# Patient Record
Sex: MALE | Race: WHITE | NOT HISPANIC OR LATINO
[De-identification: names, ages, dates, MRNs, and addresses within clinical notes are randomized per-mention and may not be internally consistent; named-entity substitution may affect disease eponyms.]

---

## 2019-06-10 ENCOUNTER — RECORD ABSTRACTING (OUTPATIENT)
Age: 74
End: 2019-06-10

## 2019-06-10 DIAGNOSIS — Q98.0 KLINEFELTER SYNDROME KARYOTYPE 47, XXY: ICD-10-CM

## 2019-06-10 DIAGNOSIS — Z86.2 PERSONAL HISTORY OF DISEASES OF THE BLOOD AND BLOOD-FORMING ORGANS AND CERTAIN DISORDERS INVOLVING THE IMMUNE MECHANISM: ICD-10-CM

## 2019-06-10 DIAGNOSIS — Z87.438 PERSONAL HISTORY OF OTHER DISEASES OF MALE GENITAL ORGANS: ICD-10-CM

## 2019-06-10 DIAGNOSIS — M19.90 UNSPECIFIED OSTEOARTHRITIS, UNSPECIFIED SITE: ICD-10-CM

## 2019-06-10 DIAGNOSIS — Z86.39 PERSONAL HISTORY OF OTHER ENDOCRINE, NUTRITIONAL AND METABOLIC DISEASE: ICD-10-CM

## 2019-06-10 RX ORDER — SIMVASTATIN 20 MG/1
20 TABLET, FILM COATED ORAL DAILY
Refills: 0 | Status: ACTIVE | COMMUNITY

## 2019-07-22 ENCOUNTER — APPOINTMENT (OUTPATIENT)
Dept: NEPHROLOGY | Facility: CLINIC | Age: 74
End: 2019-07-22
Payer: MEDICARE

## 2019-07-22 VITALS
RESPIRATION RATE: 16 BRPM | BODY MASS INDEX: 27.06 KG/M2 | OXYGEN SATURATION: 97 % | DIASTOLIC BLOOD PRESSURE: 80 MMHG | SYSTOLIC BLOOD PRESSURE: 140 MMHG | WEIGHT: 189 LBS | HEART RATE: 72 BPM | HEIGHT: 70 IN

## 2019-07-22 DIAGNOSIS — Z87.891 PERSONAL HISTORY OF NICOTINE DEPENDENCE: ICD-10-CM

## 2019-07-22 DIAGNOSIS — D46.9 MYELODYSPLASTIC SYNDROME, UNSPECIFIED: ICD-10-CM

## 2019-07-22 DIAGNOSIS — N23 UNSPECIFIED RENAL COLIC: ICD-10-CM

## 2019-07-22 DIAGNOSIS — E78.5 HYPERLIPIDEMIA, UNSPECIFIED: ICD-10-CM

## 2019-07-22 DIAGNOSIS — Z86.39 PERSONAL HISTORY OF OTHER ENDOCRINE, NUTRITIONAL AND METABOLIC DISEASE: ICD-10-CM

## 2019-07-22 PROCEDURE — 99214 OFFICE O/P EST MOD 30 MIN: CPT

## 2019-07-22 NOTE — ASSESSMENT
[FreeTextEntry1] : (1) Stage II- CKD. The recent urine microalbumin to creatinine ratio if 77 mg/g (normal is < 30). The most recent BUN/creatinine levels are 28/1.3 (03/11/2019) with an estimated GFR of 59 cc per minute.  The mildly elevated serum creatinine is likely related in large part to the thiazide diuretic and ACE inhibitor although the presence of proteinuria yields the diagnosis of chronic kidney disease.\par \par (2) Hypertension.  Elevated blood pressure.  Mr. Watts ran out of his amlodipine around 1 week ago.\par \par \par (3) Proteinuria. Contributors are likely the hypertension, diabetes mellitus, and obesity.\par \par (4) Hypertension. Elevated blood pressures. \par \par (5) Diabetes mellitus. Mr. Watts reports that his HbA1c increased from 7.5%  to 11% and then improved to 9%. Has an appointment with Dr. Serenity Black on September 5, 2019.a\par \par (6) Left kidney pain.  Unclear if this is the kidney or musculoskeletal.  Need to rule out nephrolithiasis. \par \par RECOMMENDATIONS:\par \par (1) Continue the ACE inhibitor and thiazide diuretic for both blood pressure control and renal protection\par (2) Amlodipine 5 mg PO daily.\par (3) See Dr. Black re: better diabetic control.\par (4) Renal ultrasound.\par (5) Dr. Traore, please forward Mr. Watts's next set of lab studies to me.  Please recheck a urinalysis and a random urine for microalbumin and creatinine.  You should consider increasing the dose of the lisinopril for control of the microalbuminuria if the urine microalbumin remains elevated\par \par

## 2019-07-22 NOTE — REVIEW OF SYSTEMS
[Shortness Of Breath] : shortness of breath [Diarrhea] : diarrhea [Nocturia] : nocturia [Arthralgias] : arthralgias [As Noted in HPI] : as noted in HPI [Negative] : Heme/Lymph [Dysuria] : no dysuria [FreeTextEntry7] : diarrhea.  Believes it is due to the use of Metformin.  [FreeTextEntry8] : urinary frequency, nocturia every 2 hours [de-identified] : balance issues "my leg gives out", was told that he has "weak tendons".  Underwent balance testing in the past.  Has intemrittent dizziness (occurs while walking though not with standing from a seated position).

## 2019-07-22 NOTE — HISTORY OF PRESENT ILLNESS
[FreeTextEntry1] : Mr. Allan is a 73-year old,  male who was initially referred to me for his chronic kidney\par disease. His past medical history is significant for: (1) Hypertension treated with both an ACE inhibitor\par and thiazide diuretic (2) Diabetes Mellitus since age 62, recently poorly controlled, without diabetic retinopathy (though has had LASER treatments) and with (+) peripheral neuropathy (3) Dyslipidemia (4) MDS diagnosed on a bone marrow biopsy (5) Macrocytic anemia (6) Testicular dysfunction, hypogonadism treated with monthly testosterone (7) Osteoarthritis (8) Klinefelter syndrome (47, XXY) (9) GERD and (10) Chronic kidney disease. Mr. Watts has been taking the hydrochlorothiazide and lisinopril for years. He reports that his diabetes has been poorly controlled.  His blood pressures at home have been good "up until today".  His AM blood pressure today was 160/103, prior to taking medications.  In the past the blood pressures have been good. \par

## 2019-07-22 NOTE — PHYSICAL EXAM
[General Appearance - Alert] : alert [Sclera] : the sclera and conjunctiva were normal [Extraocular Movements] : extraocular movements were intact [Neck Appearance] : the appearance of the neck was normal [Exaggerated Use Of Accessory Muscles For Inspiration] : no accessory muscle use [Respiration, Rhythm And Depth] : normal respiratory rhythm and effort [Auscultation Breath Sounds / Voice Sounds] : lungs were clear to auscultation bilaterally [Heart Rate And Rhythm] : heart rate was normal and rhythm regular [Heart Sounds Gallop] : no gallops [Heart Sounds] : normal S1 and S2 [Murmurs] : no murmurs [Edema] : there was no peripheral edema [Heart Sounds Pericardial Friction Rub] : no pericardial rub [Bowel Sounds] : normal bowel sounds [Abdomen Soft] : soft [Abdomen Tenderness] : non-tender [No CVA Tenderness] : no ~M costovertebral angle tenderness [] : no hepato-splenomegaly [No Spinal Tenderness] : no spinal tenderness [Involuntary Movements] : no involuntary movements were seen [Skin Color & Pigmentation] : normal skin color and pigmentation [Skin Turgor] : normal skin turgor [No Focal Deficits] : no focal deficits [Oriented To Time, Place, And Person] : oriented to person, place, and time [Affect] : the affect was normal [Impaired Insight] : insight and judgment were intact [Mood] : the mood was normal

## 2019-07-22 NOTE — CONSULT LETTER
[Dear  ___] : Dear  [unfilled], [Consult Letter:] : I had the pleasure of evaluating your patient, [unfilled]. [Consult Closing:] : Thank you very much for allowing me to participate in the care of this patient.  If you have any questions, please do not hesitate to contact me. [Please see my note below.] : Please see my note below. [FreeTextEntry3] : Rebel [Sincerely,] : Sincerely, [DrCaren  ___] : Dr. DOSS

## 2020-01-21 ENCOUNTER — APPOINTMENT (OUTPATIENT)
Dept: NEPHROLOGY | Facility: CLINIC | Age: 75
End: 2020-01-21
Payer: MEDICARE

## 2020-01-21 VITALS
HEART RATE: 76 BPM | BODY MASS INDEX: 27.63 KG/M2 | DIASTOLIC BLOOD PRESSURE: 72 MMHG | WEIGHT: 193 LBS | SYSTOLIC BLOOD PRESSURE: 148 MMHG | HEIGHT: 70 IN

## 2020-01-21 PROCEDURE — 99214 OFFICE O/P EST MOD 30 MIN: CPT

## 2020-01-21 RX ORDER — TESTOSTERONE CYPIONATE 200 MG/ML
200 INJECTION, SOLUTION INTRAMUSCULAR
Refills: 0 | Status: COMPLETED | COMMUNITY
Start: 1900-01-01 | End: 2020-01-21

## 2020-01-21 NOTE — REVIEW OF SYSTEMS
[Recent Weight Gain (___ Lbs)] : recent [unfilled] ~Ulb weight gain [Nocturia] : nocturia [As Noted in HPI] : as noted in HPI [Negative] : Heme/Lymph [FreeTextEntry2] : hand pain [FreeTextEntry8] : urinary frequency during the days (every 2 hours during the days and the nights) [de-identified] : Numbness and tingling in the feet and finger tips

## 2020-01-21 NOTE — HISTORY OF PRESENT ILLNESS
[FreeTextEntry1] : Moshe Allan is a 74-year old,  male who was initially referred to me for his chronic kidney\par disease. His past medical history is significant for: (1) Hypertension treated with both an ACE inhibitor\par and thiazide diuretic (2) Diabetes Mellitus since age 62, without diabetic retinopathy (though has had\par LASER treatments) and with (+) peripheral neuropathy (3) Dyslipidemia (4) MDS diagnosed on a\par bone marrow biopsy (5) Macrocytic anemia (6) Testicular dysfunction/hypogonadism treated with\par monthly testosterone (7) Osteoarthritis (8) Klinefelter syndrome (47, XXY) (9) GERD and (10) Chronic\par kidney disease. Mr. Corona has been taking the hydrochlorothiazide and lisinopril for years. The\par doses had not been changed. \par \par Mr. Corona is here for follow. He came accompanied by his wife, .  He did not get a renal ultrasound. He is feeling well.  He continues to have urinary frequency "every two hours" during the days and the nights. His full ROS is below.\par

## 2020-01-21 NOTE — CONSULT LETTER
[Dear  ___] : Dear  [unfilled], [Consult Letter:] : I had the pleasure of evaluating your patient, [unfilled]. [Please see my note below.] : Please see my note below. [Consult Closing:] : Thank you very much for allowing me to participate in the care of this patient.  If you have any questions, please do not hesitate to contact me. [FreeTextEntry3] : Rebel [Sincerely,] : Sincerely, [DrCaren  ___] : Dr. DOSS

## 2020-01-21 NOTE — ASSESSMENT
[FreeTextEntry1] : (1) Stage II CKD.  The recent BUN/creatinine levels are 28/1.3 (03/11/2019), 32/1.2 (09/09/2019), 27/1.6 (12/30/2019).  The recent urine microalbumin to creatinine ratio if 77 mg/g (normal is < 30). In the prior note I commented that the labs of 03/11/2019 had an estimated GFR of 59 cc per minute.  I thought that the  mildly elevated serum creatininewas likely related in large part to the thiazide diuretic and ACE inhibitor although the presence of proteinuria did support a diagnosis of chronic kidney disease.\par \par The recent serum creatinine has increased to 1.6 mg/dL.  I am unsure if this increase is due to taking NSAIDS (which Mr. Corona uses), is related to weight gain and diet, is related to issues emptying the bladder or another etiology.  \par \par (2) Hypertension.  Mildly elevated blood pressure today. Well controlled outside of this office.   \par \par (3) Proteinuria. Contributors are likely the hypertension, diabetes mellitus, and obesity.\par \par (4) Diabetes mellitus. Mr. Corona reports that his HbA1c increased from 7.5%  to 11% and then improved to 9.3%.  This has improved to 7.8%.  \par \par (5) Left kidney pain. This has resolved.  I wondered whether it was musculoskeletal.\par \par (6) Low 25 hydroxy vitamin D.  Level is 15.9 ng/cc. \par \par RECOMMENDATIONS:\par \par (1) Continue the ACE inhibitor and thiazide diuretic for both blood pressure control and renal protection\par (2) Start vitamin D 3000 IU once daily\par (3) Regular follow ups with Dr. Serenity Black.  \par (4) Renal ultrasound.  Bladder ultrasound\par \par Mr. Corona should have repeat renal function tests done sometime in the next month: BMP.\par He will return to me in 6 months with a CMP, CBC, phosphorous, 25 hydroxy vitamin D, urinalysis, random urine for microalbumin and creatinine.\par \par

## 2020-01-21 NOTE — PHYSICAL EXAM
[General Appearance - In No Acute Distress] : in no acute distress [General Appearance - Alert] : alert [Sclera] : the sclera and conjunctiva were normal [Extraocular Movements] : extraocular movements were intact [Neck Appearance] : the appearance of the neck was normal [] : no respiratory distress [Respiration, Rhythm And Depth] : normal respiratory rhythm and effort [Exaggerated Use Of Accessory Muscles For Inspiration] : no accessory muscle use [Auscultation Breath Sounds / Voice Sounds] : lungs were clear to auscultation bilaterally [Heart Rate And Rhythm] : heart rate was normal and rhythm regular [Heart Sounds] : normal S1 and S2 [Heart Sounds Gallop] : no gallops [Murmurs] : no murmurs [Heart Sounds Pericardial Friction Rub] : no pericardial rub [Edema] : there was no peripheral edema [Bowel Sounds] : normal bowel sounds [Abdomen Soft] : soft [Abdomen Tenderness] : non-tender [No CVA Tenderness] : no ~M costovertebral angle tenderness [No Spinal Tenderness] : no spinal tenderness [Abnormal Walk] : normal gait [Involuntary Movements] : no involuntary movements were seen [Skin Color & Pigmentation] : normal skin color and pigmentation [Skin Turgor] : normal skin turgor [Oriented To Time, Place, And Person] : oriented to person, place, and time [Impaired Insight] : insight and judgment were intact [Affect] : the affect was normal [Mood] : the mood was normal

## 2020-02-15 ENCOUNTER — TRANSCRIPTION ENCOUNTER (OUTPATIENT)
Age: 75
End: 2020-02-15

## 2020-07-16 RX ORDER — HYDROCHLOROTHIAZIDE 25 MG/1
25 TABLET ORAL DAILY
Refills: 0 | Status: DISCONTINUED | COMMUNITY
End: 2020-07-16

## 2020-07-16 RX ORDER — DICLOFENAC SODIUM 1% 10 MG/G
GEL TOPICAL
Refills: 0 | Status: DISCONTINUED | COMMUNITY
End: 2020-07-16

## 2020-07-16 RX ORDER — GLIMEPIRIDE 4 MG/1
4 TABLET ORAL
Refills: 0 | Status: DISCONTINUED | COMMUNITY
End: 2020-07-16

## 2020-07-16 RX ORDER — LISINOPRIL 40 MG/1
40 TABLET ORAL DAILY
Refills: 0 | Status: DISCONTINUED | COMMUNITY
End: 2020-07-16

## 2020-07-20 ENCOUNTER — APPOINTMENT (OUTPATIENT)
Dept: NEPHROLOGY | Facility: CLINIC | Age: 75
End: 2020-07-20
Payer: MEDICARE

## 2020-07-20 VITALS
WEIGHT: 189 LBS | SYSTOLIC BLOOD PRESSURE: 128 MMHG | TEMPERATURE: 97.1 F | BODY MASS INDEX: 27.06 KG/M2 | DIASTOLIC BLOOD PRESSURE: 72 MMHG | OXYGEN SATURATION: 98 % | RESPIRATION RATE: 16 BRPM | HEIGHT: 70 IN | HEART RATE: 77 BPM

## 2020-07-20 DIAGNOSIS — K21.9 GASTRO-ESOPHAGEAL REFLUX DISEASE W/OUT ESOPHAGITIS: ICD-10-CM

## 2020-07-20 PROCEDURE — 99214 OFFICE O/P EST MOD 30 MIN: CPT

## 2020-07-20 NOTE — ASSESSMENT
[FreeTextEntry1] : (1) Stage II CKD with a recent episode of VILLA secondary to dehydration.   The recent BUN/creatinine levels are 28/1.3 (03/11/2019), 32/1.2 (09/09/2019), 27/1.6 (12/30/2019), 25/1.41 (05/28/2020)- the last set of labs followed intravenous hydration in the hospital. The last urine microalbumin to creatinine ratio was 77 mg/g (normal is < 30).  I don't have a recent one.  I\par \par (2) Hypertension.  Well controlled. \par \par (3) Proteinuria. Contributors are likely the hypertension, diabetes mellitus, and obesity. I do not have a recent random urine for microalbumin and creatinine. \par \par (4) Diabetes mellitus. This is reportedly well controlled at home.  I do not have a recent HbA1c. \par \par (5) Low 25 hydroxy vitamin D. \par \par RECOMMENDATIONS:\par \par (1) Continue the current medication regimen.  Stay off of all diuretics.  Stay off of lisinopril for now.  This might need to be added back at a later date for control of the proteinuria. \par (2) Continue the allopurinol.  The goal serum uric acid is under 6 mg/dL.\par (3) Keep salt out of the diet.\par (4) Stay well hydrated.\par (5) Mr. Corona should have a CMP, 25 hydroxy vitamin D, urinalysis, random urine microalbumin and creatinine, and a serum uric acid during his next visit to primary care. \par (6) If all is well, I will see Mr. Corona back in 6 months with a CMP, CBC, uric acid, urinalysis, random urine for microalbumin and creatinine. \par \par \par

## 2020-07-20 NOTE — REVIEW OF SYSTEMS
[Recent Weight Gain (___ Lbs)] : recent [unfilled] ~Ulb weight gain [Nocturia] : nocturia [As Noted in HPI] : as noted in HPI [Negative] : Musculoskeletal [FreeTextEntry2] : hand pain [FreeTextEntry8] : urinary frequency during the days (every 2-3 hours during the days and the nights) [de-identified] : Numbness and tingling in the toes and finger tips "every once in a while" [de-identified] : anemia

## 2020-07-20 NOTE — PHYSICAL EXAM
[General Appearance - Alert] : alert [Sclera] : the sclera and conjunctiva were normal [Extraocular Movements] : extraocular movements were intact [General Appearance - In No Acute Distress] : in no acute distress [] : no respiratory distress [Neck Appearance] : the appearance of the neck was normal [Respiration, Rhythm And Depth] : normal respiratory rhythm and effort [Auscultation Breath Sounds / Voice Sounds] : lungs were clear to auscultation bilaterally [Heart Rate And Rhythm] : heart rate was normal and rhythm regular [Exaggerated Use Of Accessory Muscles For Inspiration] : no accessory muscle use [Murmurs] : no murmurs [Heart Sounds Gallop] : no gallops [Heart Sounds] : normal S1 and S2 [Heart Sounds Pericardial Friction Rub] : no pericardial rub [Bowel Sounds] : normal bowel sounds [Edema] : there was no peripheral edema [Abdomen Soft] : soft [Abdomen Tenderness] : non-tender [No CVA Tenderness] : no ~M costovertebral angle tenderness [Abnormal Walk] : normal gait [No Spinal Tenderness] : no spinal tenderness [Involuntary Movements] : no involuntary movements were seen [Skin Turgor] : normal skin turgor [Skin Color & Pigmentation] : normal skin color and pigmentation [Affect] : the affect was normal [Impaired Insight] : insight and judgment were intact [Oriented To Time, Place, And Person] : oriented to person, place, and time [Mood] : the mood was normal [No Focal Deficits] : no focal deficits

## 2020-07-20 NOTE — CONSULT LETTER
[Consult Letter:] : I had the pleasure of evaluating your patient, [unfilled]. [Consult Closing:] : Thank you very much for allowing me to participate in the care of this patient.  If you have any questions, please do not hesitate to contact me. [Please see my note below.] : Please see my note below. [Sincerely,] : Sincerely, [DrCaren  ___] : Dr. DOSS [Dear  ___] : Dear  [unfilled], [FreeTextEntry3] : Rebel

## 2020-07-20 NOTE — HISTORY OF PRESENT ILLNESS
[FreeTextEntry1] : Moshe Allan is a 74-year old,  male who was initially referred to me for his chronic kidney\par disease. His past medical history is significant for: (1) Hypertension (2) Diabetes Mellitus since age 62, without diabetic retinopathy (though has had LASER treatments) though with (+) peripheral neuropathy (3) Dyslipidemia (4) MDS diagnosed on a bone marrow biopsy (5) Macrocytic anemia (6) Testicular dysfunction/hypogonadism treated with monthly testosterone (7) Osteoarthritis (8) Klinefelter syndrome (47, XXY) (9) GERD and (10) Chronic kidney disease. Mr. Corona had been taking hydrochlorothiazide and lisinopril for years. He was admitted to [facility] from 05/26/2020 to 05/28/2020 with dehydration and acute kidney injury.  The hydrochlorothiazide was discontinued.  His renal function returned back to its baseline. \par \par Mr. Corona is here for follow. He came accompanied by his wife, . He is feeling well.  He and his wife have remained COVID free.  He underwent hand surgery on 06/24/2020.  Mr. Corona had a gout exacerbation in early June 2020.  \par

## 2021-01-19 ENCOUNTER — APPOINTMENT (OUTPATIENT)
Dept: NEPHROLOGY | Facility: CLINIC | Age: 76
End: 2021-01-19
Payer: MEDICARE

## 2021-01-19 VITALS
TEMPERATURE: 97.2 F | OXYGEN SATURATION: 97 % | BODY MASS INDEX: 26.92 KG/M2 | HEIGHT: 70 IN | SYSTOLIC BLOOD PRESSURE: 150 MMHG | DIASTOLIC BLOOD PRESSURE: 70 MMHG | HEART RATE: 84 BPM | RESPIRATION RATE: 16 BRPM | WEIGHT: 188 LBS

## 2021-01-19 DIAGNOSIS — I65.29 OCCLUSION AND STENOSIS OF UNSPECIFIED CAROTID ARTERY: ICD-10-CM

## 2021-01-19 PROCEDURE — 99214 OFFICE O/P EST MOD 30 MIN: CPT

## 2021-01-19 RX ORDER — LINAGLIPTIN 5 MG/1
5 TABLET, FILM COATED ORAL DAILY
Qty: 90 | Refills: 2 | Status: ACTIVE | COMMUNITY

## 2021-01-19 RX ORDER — CLOPIDOGREL BISULFATE 75 MG/1
75 TABLET, FILM COATED ORAL
Qty: 30 | Refills: 0 | Status: DISCONTINUED | COMMUNITY
Start: 2020-05-28 | End: 2021-01-19

## 2021-01-19 RX ORDER — OXYCODONE AND ACETAMINOPHEN 5; 325 MG/1; MG/1
5-325 TABLET ORAL
Qty: 30 | Refills: 0 | Status: DISCONTINUED | COMMUNITY
Start: 2020-06-24 | End: 2021-01-19

## 2021-01-19 NOTE — HISTORY OF PRESENT ILLNESS
[FreeTextEntry1] : Moshe Allan is a 75-year old,  male who was initially referred to me for his chronic kidney\par disease. His past medical history is significant for: (1) Hypertension (2) Diabetes Mellitus since age 62, without diabetic retinopathy (though has had LASER treatments), with (+) peripheral neuropathy (3) Dyslipidemia (4) MDS diagnosed on a bone marrow biopsy (5) Macrocytic anemia (6) Testicular dysfunction/hypogonadism treated with monthly testosterone (7) Osteoarthritis (8) Klinefelter syndrome (47, XXY) (9) GERD and (10) Chronic kidney disease. Mr. Corona had been taking hydrochlorothiazide and lisinopril for years. He was admitted to [facility] from 05/26/2020 to 05/28/2020 with dehydration and acute kidney injury.  The hydrochlorothiazide was discontinued.  His renal function returned back to its baseline.\par \par I last saw Mr. Corona on 07/20/2020.  He had an episode of dyspnea which self-resolved.  He is feeling well and is without complaints.

## 2021-01-19 NOTE — PHYSICAL EXAM
[General Appearance - Alert] : alert [General Appearance - In No Acute Distress] : in no acute distress [Sclera] : the sclera and conjunctiva were normal [Extraocular Movements] : extraocular movements were intact [Neck Appearance] : the appearance of the neck was normal [] : no respiratory distress [Respiration, Rhythm And Depth] : normal respiratory rhythm and effort [Exaggerated Use Of Accessory Muscles For Inspiration] : no accessory muscle use [Auscultation Breath Sounds / Voice Sounds] : lungs were clear to auscultation bilaterally [Heart Rate And Rhythm] : heart rate was normal and rhythm regular [Heart Sounds] : normal S1 and S2 [Heart Sounds Gallop] : no gallops [Heart Sounds Pericardial Friction Rub] : no pericardial rub [Edema] : there was no peripheral edema [Bowel Sounds] : normal bowel sounds [Abdomen Soft] : soft [Abdomen Tenderness] : non-tender [No CVA Tenderness] : no ~M costovertebral angle tenderness [No Spinal Tenderness] : no spinal tenderness [Abnormal Walk] : normal gait [Involuntary Movements] : no involuntary movements were seen [Skin Color & Pigmentation] : normal skin color and pigmentation [Skin Turgor] : normal skin turgor [No Focal Deficits] : no focal deficits [Oriented To Time, Place, And Person] : oriented to person, place, and time [Impaired Insight] : insight and judgment were intact [Affect] : the affect was normal [Mood] : the mood was normal [FreeTextEntry1] : MONIKA best heard in the LUSB

## 2021-01-19 NOTE — CONSULT LETTER
[Consult Letter:] : I had the pleasure of evaluating your patient, [unfilled]. [Please see my note below.] : Please see my note below. [Consult Closing:] : Thank you very much for allowing me to participate in the care of this patient.  If you have any questions, please do not hesitate to contact me. [Sincerely,] : Sincerely, [DrCaren  ___] : Dr. DOSS [Dear  ___] : Dear  [unfilled], [FreeTextEntry3] : Rebel

## 2021-01-19 NOTE — REVIEW OF SYSTEMS
[Recent Weight Gain (___ Lbs)] : recent [unfilled] ~Ulb weight gain [Nocturia] : nocturia [Negative] : Endocrine [As Noted in HPI] : as noted in HPI [FreeTextEntry8] : nocturia x 2.   [de-identified] : Numbness and tingling in the toes and finger tips "every once in a while".  Told he has 'neuropathy' [de-identified] : anemia

## 2021-01-19 NOTE — ASSESSMENT
[FreeTextEntry1] : (1) Stage II CKD with a prior episode of VILLA secondary to dehydration.   The recent BUN/creatinine levels are 28/1.3 (03/11/2019), 32/1.2 (09/09/2019), 27/1.6 (12/30/2019), 25/1.41 (05/28/2020), and most recently 25/1.2 (12/14/2020).  The eGFR is 58.8 mL per minute.  The urinalysis on this last date demonstrated 2+ albuminuria with no blood. \par \par (2) Hypertension.  Better control outside of my office.  Still, blood pressures at home are above 130 mm Hg. \par \par (3) Proteinuria. The last urine microalbumin to creatinine ratio was 77 mg per gram.  I don't have a recent one. Contributors are likely the hypertension, diabetes mellitus, and obesity. \par \par (4) Diabetes mellitus. The recent HbA1c was 6.1% (12/14/2020).\par \par (5) Low 25 hydroxy vitamin D. \par \par (6) Hypomagnesemia\par \par RECOMMENDATIONS:\par \par (1) Continue the current medication regimen.\par (2) Check a random urine for microalbumin and creatinine.\par (3) Avoid the use of salt (Mr. Corona currently cooks with salt).\par (4)  Follow the blood pressures at home.  I expalined that the goal systolic pressure is under 130 mm Hg. \par (5) I will likely add back an ACE inhibitor following the results of the urine study. \par (6) Continue the allopurinol.  The goal serum uric acid is under 6 mg/dL.\par (7) Stay well hydrated.\par (8) Mr. Corona should have a CMP, 25 hydroxy vitamin D, urinalysis, random urine microalbumin and creatinine, and a serum uric acid during his next visit to primary care. \par (9) If all is well, I will see Mr. Corona back following his visit with Dr. Patel. \par \par

## 2021-04-16 RX ORDER — VITAMIN E (DL,TOCOPHERYL ACET) 180 MG
500 CAPSULE ORAL
Qty: 180 | Refills: 2 | Status: ACTIVE | COMMUNITY
Start: 2021-04-16 | End: 1900-01-01

## 2021-05-14 RX ORDER — COLCHICINE 0.6 MG/1
0.6 TABLET ORAL
Refills: 0 | Status: DISCONTINUED | COMMUNITY
End: 2021-05-14

## 2021-05-20 ENCOUNTER — APPOINTMENT (OUTPATIENT)
Dept: NEPHROLOGY | Facility: CLINIC | Age: 76
End: 2021-05-20
Payer: MEDICARE

## 2021-05-20 VITALS
WEIGHT: 181 LBS | HEIGHT: 70 IN | BODY MASS INDEX: 25.91 KG/M2 | SYSTOLIC BLOOD PRESSURE: 132 MMHG | HEART RATE: 70 BPM | DIASTOLIC BLOOD PRESSURE: 74 MMHG | RESPIRATION RATE: 16 BRPM | OXYGEN SATURATION: 97 % | TEMPERATURE: 97.7 F

## 2021-05-20 DIAGNOSIS — M71.20 SYNOVIAL CYST OF POPLITEAL SPACE [BAKER], UNSPECIFIED KNEE: ICD-10-CM

## 2021-05-20 DIAGNOSIS — E11.9 TYPE 2 DIABETES MELLITUS W/OUT COMPLICATIONS: ICD-10-CM

## 2021-05-20 PROCEDURE — 99214 OFFICE O/P EST MOD 30 MIN: CPT

## 2021-05-20 RX ORDER — CHLORHEXIDINE GLUCONATE 4 %
1000 LIQUID (ML) TOPICAL DAILY
Refills: 0 | Status: ACTIVE | COMMUNITY

## 2021-05-20 RX ORDER — ADHESIVE TAPE 3"X 2.3 YD
50 MCG TAPE, NON-MEDICATED TOPICAL DAILY
Refills: 0 | Status: ACTIVE | COMMUNITY

## 2021-05-20 NOTE — CONSULT LETTER
[Dear  ___] : Dear  [unfilled], [Consult Letter:] : I had the pleasure of evaluating your patient, [unfilled]. [Please see my note below.] : Please see my note below. [Consult Closing:] : Thank you very much for allowing me to participate in the care of this patient.  If you have any questions, please do not hesitate to contact me. [Sincerely,] : Sincerely, [FreeTextEntry3] : Rebel [DrCaren  ___] : Dr. DOSS [DrCaren ___] : Dr. DOSS

## 2021-05-20 NOTE — HISTORY OF PRESENT ILLNESS
[FreeTextEntry1] : Moshe Allan is a 75-year old,  male who was initially referred to me for his chronic kidney\par disease. His past medical history is significant for: (1) Hypertension (2) Diabetes Mellitus since age 62, without diabetic retinopathy (though has had LASER treatments), with (+) peripheral neuropathy (3) Dyslipidemia (4) MDS diagnosed on a bone marrow biopsy (5) Macrocytic anemia (6) Testicular dysfunction/hypogonadism treated with monthly testosterone (7) Osteoarthritis (8) Klinefelter syndrome (47, XXY) (9) GERD and (10) Chronic kidney disease. Mr. Corona had been taking hydrochlorothiazide and lisinopril for years. He was admitted to [facility] from 05/26/2020 to 05/28/2020 with dehydration and acute kidney injury.  The hydrochlorothiazide was discontinued.  His renal function returned back to its baseline.\par \par I last saw Mr. Corona on 01/29/2021.  He took the COVID vaccine soon after this visit.  Following the vaccine he became ill.  He was ultimately admitted to the ICU [facility] with pneumonia.  He eventually had the second vaccine which he did not have a reaction to.

## 2021-05-20 NOTE — REVIEW OF SYSTEMS
[Recent Weight Gain (___ Lbs)] : recent [unfilled] ~Ulb weight gain [Nocturia] : nocturia [Joint Pain] : joint pain [Negative] : Endocrine [FreeTextEntry8] : nocturia x 3   [FreeTextEntry9] : osteoarthritis in neck- has not yet seen orthopedics [de-identified] : Numbness and tingling in the toes and finger tips "every once in a while".  Told he has 'neuropathy' [de-identified] : anemia.  "It takes a lot to stop bleeding if I get a cut", takes aspirin

## 2021-05-20 NOTE — PHYSICAL EXAM
[General Appearance - Alert] : alert [General Appearance - In No Acute Distress] : in no acute distress [Sclera] : the sclera and conjunctiva were normal [Extraocular Movements] : extraocular movements were intact [Neck Appearance] : the appearance of the neck was normal [] : no respiratory distress [Respiration, Rhythm And Depth] : normal respiratory rhythm and effort [Exaggerated Use Of Accessory Muscles For Inspiration] : no accessory muscle use [Auscultation Breath Sounds / Voice Sounds] : lungs were clear to auscultation bilaterally [Heart Rate And Rhythm] : heart rate was normal and rhythm regular [Heart Sounds] : normal S1 and S2 [Heart Sounds Gallop] : no gallops [FreeTextEntry1] : MONIKA best heard in the LUSB [Heart Sounds Pericardial Friction Rub] : no pericardial rub [Edema] : there was no peripheral edema [Bowel Sounds] : normal bowel sounds [Abdomen Soft] : soft [Abdomen Tenderness] : non-tender [No CVA Tenderness] : no ~M costovertebral angle tenderness [No Spinal Tenderness] : no spinal tenderness [Abnormal Walk] : normal gait [Involuntary Movements] : no involuntary movements were seen [Skin Color & Pigmentation] : normal skin color and pigmentation [Skin Turgor] : normal skin turgor [No Focal Deficits] : no focal deficits [Oriented To Time, Place, And Person] : oriented to person, place, and time [Impaired Insight] : insight and judgment were intact [Affect] : the affect was normal [Mood] : the mood was normal

## 2021-11-22 ENCOUNTER — APPOINTMENT (OUTPATIENT)
Dept: NEPHROLOGY | Facility: CLINIC | Age: 76
End: 2021-11-22
Payer: MEDICARE

## 2021-11-22 VITALS
DIASTOLIC BLOOD PRESSURE: 76 MMHG | BODY MASS INDEX: 26.92 KG/M2 | SYSTOLIC BLOOD PRESSURE: 154 MMHG | WEIGHT: 188 LBS | HEART RATE: 70 BPM | OXYGEN SATURATION: 97 % | TEMPERATURE: 97.3 F | RESPIRATION RATE: 16 BRPM | HEIGHT: 70 IN

## 2021-11-22 VITALS — SYSTOLIC BLOOD PRESSURE: 152 MMHG | DIASTOLIC BLOOD PRESSURE: 74 MMHG

## 2021-11-22 PROCEDURE — 99214 OFFICE O/P EST MOD 30 MIN: CPT

## 2021-11-22 RX ORDER — GLIMEPIRIDE 2 MG/1
2 TABLET ORAL DAILY
Refills: 0 | Status: DISCONTINUED | COMMUNITY
Start: 2021-01-14 | End: 2021-11-22

## 2021-11-22 NOTE — HISTORY OF PRESENT ILLNESS
[FreeTextEntry1] : Moshe Allan is a 76-year old,  male who was initially referred to me for his chronic kidney\par disease. His past medical history is significant for: (1) Hypertension (2) Diabetes Mellitus since age 62, without diabetic retinopathy (though has had LASER treatments), with (+) peripheral neuropathy (3) Dyslipidemia (4) MDS diagnosed on a bone marrow biopsy (5) Macrocytic anemia (6) Testicular dysfunction/hypogonadism treated with monthly testosterone (7) Osteoarthritis (8) Klinefelter syndrome (47, XXY) (9) GERD and (10) Chronic kidney disease. Mr. Corona had been taking hydrochlorothiazide and lisinopril for years. He was admitted to [facility] from 05/26/2020 to 05/28/2020 with dehydration and acute kidney injury.  The hydrochlorothiazide was discontinued.  His renal function returned back to its baseline.\par \par I last saw Mr. Corona on 05/20/2021.  He has been doing well.  He received his influenza vaccine last month.  He developed a cough.  He continues to have a residual cough.  He took the COVID vaccine soon after this visit.   He developed pneumonia following his first COVID vaccine and was admitted to [facility].  He eventually received his second vaccine.

## 2021-11-22 NOTE — PHYSICAL EXAM
[General Appearance - Alert] : alert [General Appearance - In No Acute Distress] : in no acute distress [Sclera] : the sclera and conjunctiva were normal [Extraocular Movements] : extraocular movements were intact [Neck Appearance] : the appearance of the neck was normal [] : no respiratory distress [Respiration, Rhythm And Depth] : normal respiratory rhythm and effort [Exaggerated Use Of Accessory Muscles For Inspiration] : no accessory muscle use [Auscultation Breath Sounds / Voice Sounds] : lungs were clear to auscultation bilaterally [Heart Rate And Rhythm] : heart rate was normal and rhythm regular [Heart Sounds] : normal S1 and S2 [Heart Sounds Gallop] : no gallops [Heart Sounds Pericardial Friction Rub] : no pericardial rub [FreeTextEntry1] : MONIKA best heard in the LUSB [Edema] : there was no peripheral edema [Bowel Sounds] : normal bowel sounds [Abdomen Soft] : soft [Abdomen Tenderness] : non-tender [No CVA Tenderness] : no ~M costovertebral angle tenderness [No Spinal Tenderness] : no spinal tenderness [Abnormal Walk] : normal gait [Involuntary Movements] : no involuntary movements were seen [Skin Color & Pigmentation] : normal skin color and pigmentation [Skin Turgor] : normal skin turgor [No Focal Deficits] : no focal deficits [Oriented To Time, Place, And Person] : oriented to person, place, and time [Impaired Insight] : insight and judgment were intact [Affect] : the affect was normal [Mood] : the mood was normal

## 2021-11-22 NOTE — ASSESSMENT
[FreeTextEntry1] : (1) Stage II CKD with a prior episode of VILLA secondary to dehydration.   The recent BUN/creatinine levels are 28/1.3 (03/11/2019), 32/1.2 (09/09/2019), 27/1.6 (12/30/2019), 25/1.41 (05/28/2020),  25/1.2 (12/14/2020), and most recently 27/1.32 (02/16/2021),  37/1.2 (04/14/2021), and most recently 30/1.3 (08/11/2021), 28/1.3 (10/15/2021).   The eGFR is 58.6 mL per minute.  The urine microalbumin to creatinine ration n 08/11/2021 demonstrated an improvement from 334.2 mcg/mg to 222.8 mcg/mg.  \par \par (2) Hypertension.  Elevated today in the office.  Reportedly excellent at home with blood pressures of around 130/72-78. \par \par (3) Proteinuria. The last urine microalbumin to creatinine ratio was 77 mg per gram.  I don't have a recent one. Contributors are likely the hypertension, diabetes mellitus, and obesity. \par \par (4) Diabetes mellitus. The recent HbA1c was 7.5% (08/2021).  \par \par (5) Low 25 hydroxy vitamin D.  Taking oral vitamin D 2000 IU daily. \par \par (6) Hypomagnesemia. The fractional excretion of magnesium in the urine is 1.3% which argues against urinary losses causing the hypomagnesemia.  The hypomagnesemia is likely related to the use of a PPI.   I spoke with Mr. Corona on 04/16/2021 when I received a serum magnesium of 1.0 mg/dL.  He felt strongly about not stopping the PPI due to significant reflux. He stopped taking pantoprazole and after 5 days developed significant reflux symptoms.  He restarted the pantoprazole.  I ordered magnesium oxide 500 mg PO BID which he has since taken.  \par \par RECOMMENDATIONS:\par \par (1) Ideally I would increase the losartan to 50 mg once daily in order to control the proteinuria.\par (2) Continue Tradjenta for the DM and control of proteinuria.\par (3) Continue magnesium oxide. \par (4) Avoid the use of salt (Mr. Corona currently cooks with salt).\par (5) Continue the allopurinol.  The goal serum uric acid is under 6 mg/dL.\par (6) Stay well hydrated.\par (7)   I will see Mr. Corona back in 6 months with CMP, magnesium,  25 hydroxy vitamin D, urinalysis, random urine microalbumin and creatinine, serum magnesium,  serum uric acid.\par \par Dr. Patel-  Mr. Corona will need a BMP around 1 month following the initiation of the higher dose of losartan. \par \par

## 2021-11-22 NOTE — REVIEW OF SYSTEMS
[Cough] : cough [Nocturia] : nocturia [Negative] : Endocrine [FreeTextEntry6] : cough productive of yellowish sputum, this has changed to a white sputum.  Denies dyspnea.  Temperatures to 99.9 deg F for a few days.  Most recent low grade fever was around 4 days ago.  Wheezing when lying in bed. [FreeTextEntry8] : nocturia x 3   [FreeTextEntry9] : osteoarthritis in neck has resolved.   [de-identified] : Numbness and tingling in the toes and finger tips "every once in a while".  Told he has 'neuropathy' [de-identified] : anemia

## 2022-05-23 ENCOUNTER — APPOINTMENT (OUTPATIENT)
Dept: NEPHROLOGY | Facility: CLINIC | Age: 77
End: 2022-05-23
Payer: MEDICARE

## 2022-05-23 VITALS
HEIGHT: 70 IN | DIASTOLIC BLOOD PRESSURE: 70 MMHG | BODY MASS INDEX: 27.2 KG/M2 | OXYGEN SATURATION: 97 % | WEIGHT: 190 LBS | TEMPERATURE: 97.3 F | HEART RATE: 76 BPM | RESPIRATION RATE: 18 BRPM | SYSTOLIC BLOOD PRESSURE: 138 MMHG

## 2022-05-23 DIAGNOSIS — R06.2 WHEEZING: ICD-10-CM

## 2022-05-23 DIAGNOSIS — M10.9 GOUT, UNSPECIFIED: ICD-10-CM

## 2022-05-23 PROCEDURE — 99214 OFFICE O/P EST MOD 30 MIN: CPT

## 2022-05-23 NOTE — REVIEW OF SYSTEMS
[Cough] : cough [Nocturia] : nocturia [Negative] : Endocrine [FreeTextEntry8] : nocturia x 3, urinates every 2-3 hours during the days [FreeTextEntry9] : osteoarthritis in neck has resolved.   [de-identified] : Numbness and tingling in the toes and finger tips "every once in a while".  Told he has 'neuropathy' [de-identified] : anemia

## 2022-05-23 NOTE — HISTORY OF PRESENT ILLNESS
[FreeTextEntry1] : Moshe Allan is a 76-year old,  male who was initially referred to me for his chronic kidney\par disease. His past medical history is significant for: (1) Hypertension (2) Diabetes Mellitus since age 62, without diabetic retinopathy (though has had LASER treatments), with (+) peripheral neuropathy (3) Dyslipidemia (4) MDS diagnosed on a bone marrow biopsy (5) Macrocytic anemia (6) Testicular dysfunction/hypogonadism treated with monthly testosterone (7) Osteoarthritis (8) Klinefelter syndrome (47, XXY) (9) GERD and (10) Chronic kidney disease. Mr. Corona had been taking hydrochlorothiazide and lisinopril for years. He was admitted to [facility] from 05/26/2020 to 05/28/2020 with dehydration and acute kidney injury.  The hydrochlorothiazide was discontinued.  His renal function returned back to its baseline.\par \par I last saw Mr. Corona on 11/22/2021.   He has been doing well.  He recently had a cold.  He saw his PCP, Dr. Patel, who told Mr. Corona that his blood pressure was elevated. Mr. Corona follows his blood pressures at home "on and off".  He reports having a systolic pressure of 200 mm Hg last week when he was not feeling well. Most of the time the blood pressure runs around 140/80.  \par

## 2022-05-23 NOTE — ASSESSMENT
[FreeTextEntry1] : (1) Stage II CKD with a prior episode of VILLA secondary to dehydration.   The recent BUN/creatinine levels are 28/1.3 (03/11/2019), 32/1.2 (09/09/2019), 27/1.6 (12/30/2019), 25/1.41 (05/28/2020),  25/1.2 (12/14/2020), and most recently 27/1.32 (02/16/2021),  37/1.2 (04/14/2021), and most recently 30/1.3 (08/11/2021), 28/1.3 (10/15/2021), 24/1.3 (12/10/2021), 28/1.5 (04/13/2022), 40/1.4 (04/18/2022).  The eGFR is 53.1 mL per minute.  The urine microalbumin to creatinine ration n 08/11/2021 demonstrated an improvement from 334.2 mcg/mg to 222.8 mcg/mg.  The recent value was 257.5 mcg/mg. \par \par (2) Hypertension. Mildly elevated today. \par \par (3) Proteinuria. The last urine microalbumin to creatinine ratio was 77 mg per gram.  I don't have a recent one. The urine albumin/creatinine ratio is above.   has been seeing Dr. Enrico Arevalo of hematology.  The serum free light chain assay kappa/lambda ratio was mildly elevated at 2.0. Contributors to the proteinuria are likely the hypertension, diabetes mellitus, and obesity. I can not rule out another contribution at this time. \par \par (4) Diabetes mellitus. The recent HbA1c was 7.5% (08/2021).  \par \par (5) Low 25 hydroxy vitamin D.  Taking oral vitamin D 2000 IU daily. \par \par (6) Hypomagnesemia. The fractional excretion of magnesium in the urine was 1.3% which argues against urinary losses causing the hypomagnesemia.  The hypomagnesemia is likely related to the use of a PPI.   I spoke with Mr. Corona on 04/16/2021 when I received a serum magnesium of 1.0 mg/dL.  He felt strongly about not stopping the PPI due to significant reflux. He stopped taking pantoprazole and after 5 days developed significant reflux symptoms.  He restarted the pantoprazole.  I ordered magnesium oxide 500 mg PO daily which he has since taken.  \par \par RECOMMENDATIONS:\par \par (1) Increase the magnesium tablets to twice daily \par \par (2) I do not feel comfortable raising the losartan dose due to the borderline high-normal serum potassium level.  Continue the current dose of losartan.\par \par (3) Increase amlodipine to 10 mg once daily for better blood pressure control.  I discussed the side effect of lower extremity edema. \par \par (4) Continue Tradjenta for the DM and control of proteinuria and renal protection.\par \par (5) Avoid the use of salt\par \par (6) Continue the allopurinol.  The goal serum uric acid is under 6 mg/dL.\par \par (7) Stay well hydrated.\par \par (8)   I will see Mr. Corona back in 6 months with CMP, magnesium, phosphorous, urinalysis, random urine microalbumin and protein and creatinine, serum magnesium,  serum uric acid.\par  \par \par

## 2022-05-23 NOTE — PHYSICAL EXAM
[General Appearance - Alert] : alert [General Appearance - In No Acute Distress] : in no acute distress [Sclera] : the sclera and conjunctiva were normal [Extraocular Movements] : extraocular movements were intact [Neck Appearance] : the appearance of the neck was normal [] : no respiratory distress [Respiration, Rhythm And Depth] : normal respiratory rhythm and effort [Exaggerated Use Of Accessory Muscles For Inspiration] : no accessory muscle use [Heart Rate And Rhythm] : heart rate was normal and rhythm regular [Heart Sounds] : normal S1 and S2 [Heart Sounds Gallop] : no gallops [Heart Sounds Pericardial Friction Rub] : no pericardial rub [FreeTextEntry1] : MONIKA best heard in the LUSB [Edema] : there was no peripheral edema [Bowel Sounds] : normal bowel sounds [Abdomen Soft] : soft [Abdomen Tenderness] : non-tender [Abnormal Walk] : normal gait [Nail Clubbing] : no clubbing  or cyanosis of the fingernails [Involuntary Movements] : no involuntary movements were seen [Skin Color & Pigmentation] : normal skin color and pigmentation [Skin Turgor] : normal skin turgor [No Focal Deficits] : no focal deficits [Oriented To Time, Place, And Person] : oriented to person, place, and time [Impaired Insight] : insight and judgment were intact [Affect] : the affect was normal [Mood] : the mood was normal

## 2022-11-21 ENCOUNTER — APPOINTMENT (OUTPATIENT)
Dept: NEPHROLOGY | Facility: CLINIC | Age: 77
End: 2022-11-21

## 2022-11-21 VITALS
RESPIRATION RATE: 18 BRPM | OXYGEN SATURATION: 96 % | WEIGHT: 194 LBS | BODY MASS INDEX: 27.77 KG/M2 | SYSTOLIC BLOOD PRESSURE: 138 MMHG | HEIGHT: 70 IN | TEMPERATURE: 97.6 F | DIASTOLIC BLOOD PRESSURE: 76 MMHG | HEART RATE: 82 BPM

## 2022-11-21 DIAGNOSIS — Z00.00 ENCOUNTER FOR GENERAL ADULT MEDICAL EXAMINATION W/OUT ABNORMAL FINDINGS: ICD-10-CM

## 2022-11-21 PROCEDURE — 99214 OFFICE O/P EST MOD 30 MIN: CPT

## 2022-11-21 RX ORDER — FERROUS GLUCONATE 324(38)MG
324 (38 FE) TABLET ORAL DAILY
Refills: 0 | Status: ACTIVE | COMMUNITY

## 2022-11-21 NOTE — PHYSICAL EXAM
[General Appearance - Alert] : alert [General Appearance - In No Acute Distress] : in no acute distress [Sclera] : the sclera and conjunctiva were normal [Extraocular Movements] : extraocular movements were intact [Neck Appearance] : the appearance of the neck was normal [] : no respiratory distress [Respiration, Rhythm And Depth] : normal respiratory rhythm and effort [Exaggerated Use Of Accessory Muscles For Inspiration] : no accessory muscle use [Heart Rate And Rhythm] : heart rate was normal and rhythm regular [Heart Sounds] : normal S1 and S2 [Heart Sounds Gallop] : no gallops [Heart Sounds Pericardial Friction Rub] : no pericardial rub [FreeTextEntry1] : bilateral LE 1+ pitting edema [Bowel Sounds] : normal bowel sounds [Abdomen Soft] : soft [Abdomen Tenderness] : non-tender [Abnormal Walk] : normal gait [Involuntary Movements] : no involuntary movements were seen [Skin Color & Pigmentation] : normal skin color and pigmentation [Skin Turgor] : normal skin turgor [No Focal Deficits] : no focal deficits [Oriented To Time, Place, And Person] : oriented to person, place, and time [Impaired Insight] : insight and judgment were intact [Affect] : the affect was normal [Mood] : the mood was normal

## 2022-11-21 NOTE — REVIEW OF SYSTEMS
[Abdominal Pain] : abdominal pain [Nocturia] : nocturia [Negative] : Endocrine [FreeTextEntry7] : "a lot of diarrhea" "at least 2-3 times a week".  Abdominal pain has been constant. [FreeTextEntry8] : nocturia x 3, urinates every 2-3 hours during the days [de-identified] : "lightheadedness a lot....maybe for 10-15 seconds". Numbness and tingling in the toes and finger tips "every once in a while".  Told he has 'neuropathy' [de-identified] : anemia

## 2022-11-21 NOTE — ASSESSMENT
[FreeTextEntry1] : (1) Stage II CKD with a prior episode of VILLA secondary to dehydration.   The recent BUN/creatinine levels are 28/1.3 (03/11/2019), 32/1.2 (09/09/2019), 27/1.6 (12/30/2019), 25/1.41 (05/28/2020),  25/1.2 (12/14/2020), and most recently 27/1.32 (02/16/2021),  37/1.2 (04/14/2021), and most recently 30/1.3 (08/11/2021), 28/1.3 (10/15/2021), 24/1.3 (12/10/2021), 28/1.5 (04/13/2022), 40/1.4 (04/18/2022), 28/1.4 (08/12/2022), 39/1.23 (10/20/2022)..  The eGFR is 54.9 mL per minute.  The urine microalbumin to creatinine ratio has trended as follows:   334.2 mcg/mg,  222.8 mcg/mg, 257.5 mcg/mg, 510.1 mcg/mg (08/12/2022).   The urine protein to creatinine ratio is 1.087  gram per gram.  \par (2) Hypertension. Mildly elevated today. \par \par (3) Proteinuria.  The serum free light chain assay kappa/lambda ratio was mildly elevated at 2.0. Contributors to the proteinuria are likely the hypertension, diabetes mellitus, and obesity. I can not rule out another contribution at this time.   \par \par (4) Diabetes mellitus.  \par \par (5) Low 25 hydroxy vitamin D.  Taking oral vitamin D 2000 IU daily. \par \par (6) Hypomagnesemia. The fractional excretion of magnesium in the urine was 1.3% which argues against urinary losses causing the hypomagnesemia.  The hypomagnesemia is likely related to the use of a PPI.   I spoke with Mr. Corona on 04/16/2021 when I received a serum magnesium of 1.0 mg/dL.  He felt strongly about not stopping the PPI due to significant reflux. He stopped taking pantoprazole and after 5 days developed significant reflux symptoms.  He restarted the pantoprazole.  I ordered magnesium oxide 500 mg PO daily which he has since taken.  \par \par (7) Dizziness.  Occurs at all times.  Unclear if intravascular depletion related to diarrhea is a contributor here. \par \par RECOMMENDATIONS:\par \par (1) Continue magnesium supplements.\par \par (2) Ideally I would increase the dose of losartan for proteinuria control although the serum potassium is 5.2 meq per liter. Suggest no raising at this time.\par \par (3) Consider adding back a low dose of hydrochlorothiazide and lowering the amlodipine dose.  This will improve the proteinuria, and ideally improve the lower extremity edema.  Given a prior history of VILLA due to dehydration, will need to follow the renal function closely.  Of course, I would not make this change until the diarrhea issue has been resolved.  I am making no changes to the medication regimen.\par \par (5) Consider changing the short acting metformin to the slow release tablet as this might resolve the diarrhea.\par \par (6)  I will see Mr. Corona back in 6 months with CMP, magnesium, phosphorous, urinalysis, random urine microalbumin and protein and creatinine, serum magnesium,  serum uric acid.\par  \par \par

## 2022-11-21 NOTE — HISTORY OF PRESENT ILLNESS
[FreeTextEntry1] : Moshe Allan is a 77-year old,  male who was initially referred to me for his chronic kidney\par disease. His past medical history is significant for: (1) Hypertension (2) Diabetes Mellitus since age 62, without diabetic retinopathy (though has had LASER treatments), with (+) peripheral neuropathy (3) Dyslipidemia (4) MDS diagnosed on a bone marrow biopsy (5) Macrocytic anemia (6) Testicular dysfunction/hypogonadism treated with prior use of monthly testosterone (7) Osteoarthritis (8) Klinefelter syndrome (47, XXY) (9) GERD and (10) Chronic kidney disease.  He was admitted to [facility] from 05/26/2020 to 05/28/2020 with dehydration and acute kidney injury.  The hydrochlorothiazide was discontinued.  His renal function returned back to its baseline.\par \par I last saw Mr. Corona on 05/23/2022.  He is here for follow up.  Mr. Corona has remained healthy except for an episode of COVID 19 diagnosed soon after his visit with me.  He was sent to the [facility] ED from which he was discharged.  \par

## 2023-05-17 ENCOUNTER — NON-APPOINTMENT (OUTPATIENT)
Age: 78
End: 2023-05-17

## 2023-05-22 ENCOUNTER — APPOINTMENT (OUTPATIENT)
Dept: NEPHROLOGY | Facility: CLINIC | Age: 78
End: 2023-05-22

## 2023-06-07 ENCOUNTER — APPOINTMENT (OUTPATIENT)
Dept: NEPHROLOGY | Facility: CLINIC | Age: 78
End: 2023-06-07
Payer: MEDICARE

## 2023-06-07 VITALS
RESPIRATION RATE: 18 BRPM | BODY MASS INDEX: 28.63 KG/M2 | HEART RATE: 66 BPM | OXYGEN SATURATION: 66 % | SYSTOLIC BLOOD PRESSURE: 132 MMHG | HEIGHT: 70 IN | DIASTOLIC BLOOD PRESSURE: 70 MMHG | WEIGHT: 200 LBS | TEMPERATURE: 97.4 F

## 2023-06-07 PROCEDURE — 99214 OFFICE O/P EST MOD 30 MIN: CPT

## 2023-06-07 RX ORDER — ALBUTEROL SULFATE 90 UG/1
108 (90 BASE) INHALANT RESPIRATORY (INHALATION) EVERY 4 HOURS
Qty: 1 | Refills: 0 | Status: DISCONTINUED | COMMUNITY
Start: 2022-05-23 | End: 2023-06-07

## 2023-06-07 RX ORDER — METFORMIN HYDROCHLORIDE 1000 MG/1
1000 TABLET, COATED ORAL TWICE DAILY
Refills: 0 | Status: DISCONTINUED | COMMUNITY
End: 2023-06-07

## 2023-06-07 RX ORDER — METFORMIN HYDROCHLORIDE 500 MG/1
500 TABLET, COATED ORAL TWICE DAILY
Refills: 0 | Status: ACTIVE | COMMUNITY

## 2023-06-07 NOTE — HISTORY OF PRESENT ILLNESS
[FreeTextEntry1] : Moshe Allan is a 77-year old,  male who was initially referred to me for his chronic kidney\par disease. His past medical history is significant for: (1) Hypertension (2) Diabetes Mellitus since age 62, without diabetic retinopathy (though has had LASER treatments), with (+) peripheral neuropathy (3) Dyslipidemia (4) MDS diagnosed on a bone marrow biopsy (5) Macrocytic anemia (6) Testicular dysfunction/hypogonadism treated with prior use of monthly testosterone (7) Osteoarthritis (8) Klinefelter syndrome (47, XXY) (9) GERD and (10) Chronic kidney disease.  \par \par I last saw Mr. Corona on 11/21/2022.  He is here for follow up.  Mr. Corona has remained healthy.  He feels well and is without complaints.  His full ROS is below.\par

## 2023-06-07 NOTE — REVIEW OF SYSTEMS
[Abdominal Pain] : abdominal pain [Nocturia] : nocturia [Negative] : Endocrine [FreeTextEntry7] : Continues to have diarrhea 2-3 times per week on Metformin..  Stopped Jardiance due to dehydration.constant. [FreeTextEntry8] : nocturia x 3, urinates every 2-3 hours during the days [FreeTextEntry9] : LLE edema s/p duplex ("no blood clot....Baker's cyst).  Left "bone on bone arthritis" in the knee.  [de-identified] : "lightheadedness on and off.... 10-15 seconds". Numbness and tingling in the toes and finger tips.  Told he has 'neuropathy' [de-identified] : anemia

## 2023-06-07 NOTE — PHYSICAL EXAM
[General Appearance - Alert] : alert [General Appearance - In No Acute Distress] : in no acute distress [Sclera] : the sclera and conjunctiva were normal [Extraocular Movements] : extraocular movements were intact [Neck Appearance] : the appearance of the neck was normal [Respiration, Rhythm And Depth] : normal respiratory rhythm and effort [] : no respiratory distress [Exaggerated Use Of Accessory Muscles For Inspiration] : no accessory muscle use [Auscultation Breath Sounds / Voice Sounds] : lungs were clear to auscultation bilaterally [Heart Rate And Rhythm] : heart rate was normal and rhythm regular [Heart Sounds] : normal S1 and S2 [Heart Sounds Gallop] : no gallops [Heart Sounds Pericardial Friction Rub] : no pericardial rub [FreeTextEntry1] : bilateral LE 1+ pitting edema [Bowel Sounds] : normal bowel sounds [Abdomen Soft] : soft [Abdomen Tenderness] : non-tender [Abnormal Walk] : normal gait [Involuntary Movements] : no involuntary movements were seen [Skin Color & Pigmentation] : normal skin color and pigmentation [Skin Turgor] : normal skin turgor [No Focal Deficits] : no focal deficits [Oriented To Time, Place, And Person] : oriented to person, place, and time [Impaired Insight] : insight and judgment were intact [Affect] : the affect was normal [Mood] : the mood was normal

## 2023-07-20 ENCOUNTER — NON-APPOINTMENT (OUTPATIENT)
Age: 78
End: 2023-07-20

## 2023-08-15 ENCOUNTER — APPOINTMENT (OUTPATIENT)
Dept: NEPHROLOGY | Facility: CLINIC | Age: 78
End: 2023-08-15

## 2023-10-24 ENCOUNTER — APPOINTMENT (OUTPATIENT)
Dept: NEPHROLOGY | Facility: CLINIC | Age: 78
End: 2023-10-24
Payer: MEDICARE

## 2023-10-24 VITALS
BODY MASS INDEX: 28.35 KG/M2 | RESPIRATION RATE: 18 BRPM | DIASTOLIC BLOOD PRESSURE: 82 MMHG | HEIGHT: 70 IN | HEART RATE: 62 BPM | WEIGHT: 198 LBS | TEMPERATURE: 97.3 F | OXYGEN SATURATION: 98 % | SYSTOLIC BLOOD PRESSURE: 134 MMHG

## 2023-10-24 VITALS
BODY MASS INDEX: 28.35 KG/M2 | WEIGHT: 198 LBS | DIASTOLIC BLOOD PRESSURE: 82 MMHG | TEMPERATURE: 97.3 F | SYSTOLIC BLOOD PRESSURE: 134 MMHG | HEIGHT: 70 IN | RESPIRATION RATE: 16 BRPM | OXYGEN SATURATION: 98 % | HEART RATE: 62 BPM

## 2023-10-24 DIAGNOSIS — R79.89 OTHER SPECIFIED ABNORMAL FINDINGS OF BLOOD CHEMISTRY: ICD-10-CM

## 2023-10-24 DIAGNOSIS — R35.0 FREQUENCY OF MICTURITION: ICD-10-CM

## 2023-10-24 DIAGNOSIS — R33.9 RETENTION OF URINE, UNSPECIFIED: ICD-10-CM

## 2023-10-24 PROCEDURE — 99214 OFFICE O/P EST MOD 30 MIN: CPT

## 2023-10-24 RX ORDER — FAMOTIDINE 20 MG/1
20 TABLET, FILM COATED ORAL
Qty: 180 | Refills: 1 | Status: ACTIVE | COMMUNITY
Start: 2023-10-24 | End: 1900-01-01

## 2023-10-24 RX ORDER — AMLODIPINE BESYLATE 10 MG/1
10 TABLET ORAL DAILY
Qty: 90 | Refills: 2 | Status: DISCONTINUED | COMMUNITY
End: 2023-10-24

## 2023-12-31 PROBLEM — Q98.0 KLINEFELTER'S SYNDROME KARYOTYPE 47 XXY: Status: RESOLVED | Noted: 2019-06-10 | Resolved: 2023-12-31

## 2024-02-20 LAB
ALBUMIN SERPL ELPH-MCNC: 4.3 G/DL
ALP BLD-CCNC: 81 U/L
ALT SERPL-CCNC: 16 U/L
ANION GAP SERPL CALC-SCNC: 10 MMOL/L
APPEARANCE: CLEAR
AST SERPL-CCNC: 16 U/L
BACTERIA: ABNORMAL /HPF
BILIRUB SERPL-MCNC: 0.3 MG/DL
BILIRUBIN URINE: NEGATIVE
BLOOD URINE: NEGATIVE
BUN SERPL-MCNC: 40 MG/DL
CALCIUM SERPL-MCNC: 9.4 MG/DL
CAST: 0 /LPF
CHLORIDE SERPL-SCNC: 106 MMOL/L
CO2 SERPL-SCNC: 24 MMOL/L
COLOR: YELLOW
CREAT SERPL-MCNC: 1.67 MG/DL
CREAT SPEC-SCNC: 79 MG/DL
EGFR: 42 ML/MIN/1.73M2
EPITHELIAL CELLS: 1 /HPF
GLUCOSE QUALITATIVE U: NEGATIVE MG/DL
GLUCOSE SERPL-MCNC: 132 MG/DL
KETONES URINE: NEGATIVE MG/DL
LEUKOCYTE ESTERASE URINE: NEGATIVE
MICROSCOPIC-UA: NORMAL
NITRITE URINE: NEGATIVE
PH URINE: 5.5
PHOSPHATE SERPL-MCNC: 4.5 MG/DL
PHOSPHOLIPASE A2 RECEPTOR ELISA: 1.8 RU/ML
POTASSIUM SERPL-SCNC: 5 MMOL/L
PROT SERPL-MCNC: 7 G/DL
PROTEIN URINE: 100 MG/DL
RED BLOOD CELLS URINE: 0 /HPF
SODIUM ?TM SUB UR QN: 85 MMOL/L
SODIUM SERPL-SCNC: 140 MMOL/L
SPECIFIC GRAVITY URINE: 1.02
UROBILINOGEN URINE: 0.2 MG/DL
UUN UR-MCNC: 825 MG/DL
WHITE BLOOD CELLS URINE: 0 /HPF

## 2024-02-22 ENCOUNTER — APPOINTMENT (OUTPATIENT)
Dept: NEPHROLOGY | Facility: CLINIC | Age: 79
End: 2024-02-22
Payer: MEDICARE

## 2024-02-22 VITALS
OXYGEN SATURATION: 97 % | HEIGHT: 70 IN | WEIGHT: 200 LBS | SYSTOLIC BLOOD PRESSURE: 142 MMHG | RESPIRATION RATE: 16 BRPM | BODY MASS INDEX: 28.63 KG/M2 | HEART RATE: 63 BPM | DIASTOLIC BLOOD PRESSURE: 78 MMHG | TEMPERATURE: 97.2 F

## 2024-02-22 DIAGNOSIS — N18.30 CHRONIC KIDNEY DISEASE, STAGE 3 UNSPECIFIED: ICD-10-CM

## 2024-02-22 DIAGNOSIS — R80.9 PROTEINURIA, UNSPECIFIED: ICD-10-CM

## 2024-02-22 DIAGNOSIS — I10 ESSENTIAL (PRIMARY) HYPERTENSION: ICD-10-CM

## 2024-02-22 DIAGNOSIS — E83.42 HYPOMAGNESEMIA: ICD-10-CM

## 2024-02-22 PROCEDURE — 99214 OFFICE O/P EST MOD 30 MIN: CPT

## 2024-02-22 RX ORDER — PANTOPRAZOLE 20 MG/1
20 TABLET, DELAYED RELEASE ORAL DAILY
Qty: 30 | Refills: 0 | Status: DISCONTINUED | COMMUNITY
End: 2024-02-22

## 2024-02-22 RX ORDER — LOSARTAN POTASSIUM 50 MG/1
50 TABLET, FILM COATED ORAL DAILY
Qty: 90 | Refills: 2 | Status: ACTIVE | COMMUNITY
Start: 2021-05-20 | End: 1900-01-01

## 2024-02-22 RX ORDER — METOPROLOL SUCCINATE 50 MG/1
50 TABLET, EXTENDED RELEASE ORAL DAILY
Qty: 90 | Refills: 2 | Status: ACTIVE | COMMUNITY

## 2024-02-22 RX ORDER — ALLOPURINOL 300 MG/1
300 TABLET ORAL DAILY
Qty: 90 | Refills: 1 | Status: ACTIVE | COMMUNITY

## 2024-02-22 NOTE — ASSESSMENT
[FreeTextEntry1] :  (1) Stage III CKD with a prior episode of VILLA secondary to dehydration. The recent BUN/creatinine levels are 28/1.3 (03/11/2019), 32/1.2 (09/09/2019), 27/1.6 (12/30/2019), 25/1.41 (05/28/2020), 25/1.2 (12/14/2020), and most recently 27/1.32 (02/16/2021), 37/1.2 (04/14/2021), and most recently 30/1.3 (08/11/2021), 28/1.3 (10/15/2021), 24/1.3 (12/10/2021), 28/1.5 (04/13/2022), 40/1.4 (04/18/2022), 28/1.4 (08/12/2022), 39/1.23 (10/20/2022), 29/1.7 (03/16/2023), 27/1.6 (04/20/2023), and most recently 35/1.8 ((07/07/2023), 41/2.0 (10/06/2023), 40/1.67 (02/09/2024). The eGFR is 42 mL per minute.  Mr. Corona has 1 gram of proteinuria per gram of creatinine. His serum CO2 (24 meq/L) and serum potassium (5.0 meq/L) are well controlled.  (2) Hypertension. Mildly elevated today.  (3) Proteinuria. The urine microalbumin to creatinine ratio has trended as follows: 334.2 mcg/mg, 222.8 mcg/mg, 257.5 mcg/mg, 510.1 mcg/mg (08/12/2022), 642.9 mg/g (03/16/2023. The last  urine protein to creatinine ratio was 1.102 gram per gram in March 2023. The UPCR was 1.0 grams per gram. The serum free light chain assay kappa/lambda ratio was 1.5 (normal). The SPEP was normal (both on 04/20/2023). Contributors to the proteinuria are likely the hypertension, diabetes mellitus, and obesity. I cannot rule out another contribution at this time.  (4) Diabetes mellitus.  (5) Low 25 hydroxy vitamin D. Taking oral vitamin D 2000 IU daily.  (6) Hypomagnesemia. The fractional excretion of magnesium in the urine was 1.3% which argued against urinary losses causing the hypomagnesemia. The hypomagnesemia was likely related to the use of a PPI. I spoke with Mr. Corona on 04/16/2021 when I received a serum magnesium of 1.0 mg/dL. He ultimately stopped the PPI and has been taking an H-2 blocker and feels well.  (7) Abdominal pain. Unclear etiology.   RECOMMENDATIONS:  (1) Avoid the use of NSAIDS all pain medication other than Tylenol. (2) Stop magnesium supplements.  Will check serum magnesium with the next set of labs.  (3) Continue losartan for renal protection and control of proteinuria. (4) Continue famotidine 20 mg PO BID in place of PPI. (5) Consider GI visit for endoscopy.  (6) I will see Mr. Corona back in 4 months with CMP, magnesium, phosphorous, urinalysis, random urine microalbumin and protein and creatinine, serum magnesium, serum uric acid.  I recommended that Mr. Corona measure his blood pressures at home, explaining that the goal is <130/80.   We talked about avoiding salt. Get regular exercise.

## 2024-02-22 NOTE — CONSULT LETTER
[Dear  ___] : Dear  [unfilled], [Consult Letter:] : I had the pleasure of evaluating your patient, [unfilled]. [Consult Closing:] : Thank you very much for allowing me to participate in the care of this patient.  If you have any questions, please do not hesitate to contact me. [Please see my note below.] : Please see my note below. [Sincerely,] : Sincerely, [FreeTextEntry3] : Rebel [DrCaren  ___] : Dr. DOSS [DrCaren ___] : Dr. DOSS

## 2024-02-22 NOTE — HISTORY OF PRESENT ILLNESS
[FreeTextEntry1] : Moshe Allan is a 78-year old,  male who was initially referred to me for his chronic kidney disease. His past medical history is significant for: (1) Hypertension (2) Diabetes Mellitus since age 62, without diabetic retinopathy (though has had LASER treatments), with (+) peripheral neuropathy (3) Dyslipidemia (4) MDS diagnosed on a bone marrow biopsy (5) Macrocytic anemia (6) Testicular dysfunction/hypogonadism treated with prior use of monthly testosterone (7) Osteoarthritis (8) Klinefelter syndrome (47, XXY) (9) GERD and (10) Chronic kidney disease.    I last saw Mr. Corona on 10/24/2023. He is here for follow up.  He is accompanied by his wife, .  Mr. Corona has remained healthy.  He feels well and is without complaints.  His full ROS is below.  He states he had a negative cardiac stress test.

## 2024-02-22 NOTE — PHYSICAL EXAM
[General Appearance - Alert] : alert [General Appearance - In No Acute Distress] : in no acute distress [Sclera] : the sclera and conjunctiva were normal [Extraocular Movements] : extraocular movements were intact [Neck Appearance] : the appearance of the neck was normal [] : no respiratory distress [Respiration, Rhythm And Depth] : normal respiratory rhythm and effort [Exaggerated Use Of Accessory Muscles For Inspiration] : no accessory muscle use [Auscultation Breath Sounds / Voice Sounds] : lungs were clear to auscultation bilaterally [Heart Rate And Rhythm] : heart rate was normal and rhythm regular [Heart Sounds] : normal S1 and S2 [Heart Sounds Gallop] : no gallops [Heart Sounds Pericardial Friction Rub] : no pericardial rub [FreeTextEntry1] : MONIKA best heard in the LUSB [Edema] : there was no peripheral edema [Bowel Sounds] : normal bowel sounds [Abdomen Soft] : soft [Abdomen Tenderness] : non-tender [Abnormal Walk] : normal gait [Involuntary Movements] : no involuntary movements were seen [Skin Color & Pigmentation] : normal skin color and pigmentation [Skin Turgor] : normal skin turgor [No Focal Deficits] : no focal deficits [Impaired Insight] : insight and judgment were intact [Oriented To Time, Place, And Person] : oriented to person, place, and time [Affect] : the affect was normal [Mood] : the mood was normal

## 2024-02-22 NOTE — REVIEW OF SYSTEMS
[Feeling Tired] : feeling tired [Abdominal Pain] : abdominal pain [Nocturia] : nocturia [Easy Bruising] : a tendency for easy bruising [Negative] : Endocrine [FreeTextEntry4] : sinus issues [FreeTextEntry7] : epigastric pain has been going on for the last "year or two".  [FreeTextEntry8] : nocturia x 1 [FreeTextEntry9] : LLE edema s/p duplex ("no blood clot....Baker's cyst). Resolved after discontinuation of amlodipine. 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   [de-identified] : Numbness and tingling in the toes and fingertips.  Told he has 'neuropathy' [de-identified] : Stable thyroid nodule. Sees Dr. Serenity Black.  [de-identified] : anemia

## 2024-08-07 ENCOUNTER — LABORATORY RESULT (OUTPATIENT)
Age: 79
End: 2024-08-07

## 2024-08-20 ENCOUNTER — APPOINTMENT (OUTPATIENT)
Dept: NEPHROLOGY | Facility: CLINIC | Age: 79
End: 2024-08-20

## 2024-08-30 DIAGNOSIS — K21.9 GASTRO-ESOPHAGEAL REFLUX DISEASE W/OUT ESOPHAGITIS: ICD-10-CM

## 2024-09-04 ENCOUNTER — NON-APPOINTMENT (OUTPATIENT)
Age: 79
End: 2024-09-04

## 2024-12-12 ENCOUNTER — NON-APPOINTMENT (OUTPATIENT)
Age: 79
End: 2024-12-12

## 2024-12-17 ENCOUNTER — APPOINTMENT (OUTPATIENT)
Dept: NEPHROLOGY | Facility: CLINIC | Age: 79
End: 2024-12-17
Payer: MEDICARE

## 2024-12-17 VITALS
TEMPERATURE: 97.6 F | HEIGHT: 70 IN | BODY MASS INDEX: 27.63 KG/M2 | SYSTOLIC BLOOD PRESSURE: 132 MMHG | WEIGHT: 193 LBS | HEART RATE: 72 BPM | DIASTOLIC BLOOD PRESSURE: 84 MMHG | OXYGEN SATURATION: 97 % | RESPIRATION RATE: 16 BRPM

## 2024-12-17 DIAGNOSIS — N18.30 CHRONIC KIDNEY DISEASE, STAGE 3 UNSPECIFIED: ICD-10-CM

## 2024-12-17 DIAGNOSIS — I10 ESSENTIAL (PRIMARY) HYPERTENSION: ICD-10-CM

## 2024-12-17 DIAGNOSIS — R80.9 PROTEINURIA, UNSPECIFIED: ICD-10-CM

## 2024-12-17 PROCEDURE — 99214 OFFICE O/P EST MOD 30 MIN: CPT

## 2024-12-17 RX ORDER — TIRZEPATIDE 5 MG/.5ML
5 INJECTION, SOLUTION SUBCUTANEOUS
Refills: 0 | Status: ACTIVE | COMMUNITY

## 2025-04-02 ENCOUNTER — LABORATORY RESULT (OUTPATIENT)
Age: 80
End: 2025-04-02

## 2025-04-16 ENCOUNTER — APPOINTMENT (OUTPATIENT)
Dept: NEPHROLOGY | Facility: CLINIC | Age: 80
End: 2025-04-16
Payer: MEDICARE

## 2025-04-16 VITALS
HEART RATE: 84 BPM | DIASTOLIC BLOOD PRESSURE: 74 MMHG | WEIGHT: 175 LBS | HEIGHT: 70 IN | SYSTOLIC BLOOD PRESSURE: 136 MMHG | BODY MASS INDEX: 25.05 KG/M2 | RESPIRATION RATE: 14 BRPM

## 2025-04-16 DIAGNOSIS — N18.30 CHRONIC KIDNEY DISEASE, STAGE 3 UNSPECIFIED: ICD-10-CM

## 2025-04-16 DIAGNOSIS — E83.42 HYPOMAGNESEMIA: ICD-10-CM

## 2025-04-16 DIAGNOSIS — R80.9 PROTEINURIA, UNSPECIFIED: ICD-10-CM

## 2025-04-16 DIAGNOSIS — I10 ESSENTIAL (PRIMARY) HYPERTENSION: ICD-10-CM

## 2025-04-16 PROCEDURE — 99214 OFFICE O/P EST MOD 30 MIN: CPT

## 2025-04-16 RX ORDER — MAGNESIUM OXIDE 400 MG/1
400 TABLET ORAL
Refills: 0 | Status: ACTIVE | COMMUNITY

## 2025-08-25 ENCOUNTER — NON-APPOINTMENT (OUTPATIENT)
Age: 80
End: 2025-08-25